# Patient Record
Sex: FEMALE | Race: WHITE | NOT HISPANIC OR LATINO | Employment: FULL TIME | URBAN - METROPOLITAN AREA
[De-identification: names, ages, dates, MRNs, and addresses within clinical notes are randomized per-mention and may not be internally consistent; named-entity substitution may affect disease eponyms.]

---

## 2024-06-27 ENCOUNTER — HOSPITAL ENCOUNTER (EMERGENCY)
Facility: HOSPITAL | Age: 44
Discharge: HOME | End: 2024-06-27
Attending: EMERGENCY MEDICINE
Payer: OTHER MISCELLANEOUS

## 2024-06-27 ENCOUNTER — APPOINTMENT (OUTPATIENT)
Dept: GENERAL RADIOLOGY | Facility: HOSPITAL | Age: 44
End: 2024-06-27
Payer: OTHER MISCELLANEOUS

## 2024-06-27 VITALS
SYSTOLIC BLOOD PRESSURE: 104 MMHG | WEIGHT: 180.34 LBS | TEMPERATURE: 97.5 F | DIASTOLIC BLOOD PRESSURE: 57 MMHG | BODY MASS INDEX: 30.79 KG/M2 | OXYGEN SATURATION: 100 % | HEIGHT: 64 IN | HEART RATE: 77 BPM | RESPIRATION RATE: 20 BRPM

## 2024-06-27 DIAGNOSIS — S90.01XA CONTUSION OF RIGHT ANKLE, INITIAL ENCOUNTER: Primary | ICD-10-CM

## 2024-06-27 DIAGNOSIS — S90.31XA CONTUSION OF RIGHT HEEL, INITIAL ENCOUNTER: ICD-10-CM

## 2024-06-27 DIAGNOSIS — T14.8XXA SKIN AVULSION: ICD-10-CM

## 2024-06-27 PROCEDURE — 90471 IMMUNIZATION ADMIN: CPT | Performed by: NURSE PRACTITIONER

## 2024-06-27 PROCEDURE — 25010000002 TETANUS-DIPHTH-ACELL PERTUSSIS 5-2.5-18.5 LF-MCG/0.5 SUSPENSION PREFILLED SYRINGE: Performed by: NURSE PRACTITIONER

## 2024-06-27 PROCEDURE — 90715 TDAP VACCINE 7 YRS/> IM: CPT | Performed by: NURSE PRACTITIONER

## 2024-06-27 PROCEDURE — 99283 EMERGENCY DEPT VISIT LOW MDM: CPT

## 2024-06-27 PROCEDURE — 73610 X-RAY EXAM OF ANKLE: CPT

## 2024-06-27 RX ORDER — TRAMADOL HYDROCHLORIDE 50 MG/1
50 TABLET ORAL ONCE
Status: COMPLETED | OUTPATIENT
Start: 2024-06-27 | End: 2024-06-27

## 2024-06-27 RX ADMIN — TETANUS TOXOID, REDUCED DIPHTHERIA TOXOID AND ACELLULAR PERTUSSIS VACCINE, ADSORBED 0.5 ML: 5; 2.5; 8; 8; 2.5 SUSPENSION INTRAMUSCULAR at 04:18

## 2024-06-27 RX ADMIN — TRAMADOL HYDROCHLORIDE 50 MG: 50 TABLET, FILM COATED ORAL at 05:00

## 2024-06-27 NOTE — ED PROVIDER NOTES
"Time: 3:40 AM EDT  Date of encounter:  6/27/2024  Independent Historian/Clinical History and Information was obtained by:   Patient    History is limited by: N/A    Chief Complaint: Right ankle pain and injury      History of Present Illness:  Patient is a 44 y.o. year old female who presents to the emergency department for evaluation of right ankle pain and injury.  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  She states it is shooting pain from the back of her heel all the way to her toes.  No numbness tingling or weakness.  Patient suffered a small to the back of her ankle when she jerked her leg free.  Tetanus is not up-to-date.  Patient rates pain 8 out of 10    HPI    Patient Care Team  Primary Care Provider: Provider, No Known    Past Medical History:     No Known Allergies  History reviewed. No pertinent past medical history.  History reviewed. No pertinent surgical history.  History reviewed. No pertinent family history.    Home Medications:  Prior to Admission medications    Not on File        Social History:          Review of Systems:  Review of Systems   Musculoskeletal:  Positive for arthralgias (Right ankle and heel) and gait problem (Pain with ambulation). Negative for back pain and joint swelling.   Skin:  Positive for wound (Had a cut on the back of her ankle from where she jerked her foot out).   Neurological:  Negative for weakness and numbness.   Hematological: Negative.    Psychiatric/Behavioral: Negative.     All other systems reviewed and are negative.       Physical Exam:  /57 (BP Location: Left arm, Patient Position: Sitting)   Pulse 77   Temp 97.5 °F (36.4 °C) (Oral)   Resp 20   Ht 162.6 cm (64\")   Wt 81.8 kg (180 lb 5.4 oz)   LMP 06/09/2024   SpO2 100%   BMI 30.95 kg/m²     Physical " Exam  Vitals and nursing note reviewed.   HENT:      Nose: Nose normal.      Mouth/Throat:      Mouth: Mucous membranes are moist.   Eyes:      Conjunctiva/sclera: Conjunctivae normal.   Cardiovascular:      Pulses: Normal pulses.   Pulmonary:      Effort: Pulmonary effort is normal.   Musculoskeletal:         General: Tenderness (Tenderness posterior ankle and heel as well as lateral ankle) present. No swelling.      Cervical back: Normal range of motion.      Comments: Painful range of motion of any degree of right ankle   Skin:     Capillary Refill: Capillary refill takes less than 2 seconds.      Comments: Tiny superficial 1 cm skin avulsion to posterior ankle no active bleeding.  No repairable wound   Neurological:      General: No focal deficit present.      Mental Status: She is alert.   Psychiatric:         Mood and Affect: Mood normal.         Behavior: Behavior normal.      Comments: Patient anxious                  Medical Decision Making:      Comorbidities that affect care:    None    External Notes reviewed:    None      The following orders were placed and all results were independently analyzed by me:  Orders Placed This Encounter   Procedures    [facility] 11.  Crutches; Prevents Completion of MRADLs Within Reasonable Time Frame; Able to Safely Use Equipment; Mobility Deficit Can Be Sufficiently Resolved By Use of Equipment    XR Ankle 3+ View Right    Apply ace wrap       Medications Given in the Emergency Department:  Medications   traMADol (ULTRAM) tablet 50 mg (has no administration in time range)   Tetanus-Diphth-Acell Pertussis (BOOSTRIX) injection 0.5 mL (0.5 mL Intramuscular Given 6/27/24 0418)        ED Course:    ED Course as of 06/27/24 0451   Thu Jun 27, 2024   0431 XR Ankle 3+ View Right  No acute findings. Chronic spurring [DS]      ED Course User Index  [DS] Bella Weiss APRN       Labs:    Lab Results (last 24 hours)       ** No results found for the last 24 hours. **              Imaging:    XR Ankle 3+ View Right    Result Date: 6/27/2024  XR ANKLE 3+ VW RIGHT Date of Exam: 6/27/2024 4:10 AM EDT Indication: injury/ pain Comparison: None available. Findings: Small posterior and plantar calcaneal spurs are present. There is mild chronic spurring on both sides of the ankle. No acute fracture. No dislocation. No bone erosion or destruction. Soft tissues are normal.     Chronic changes without acute fracture or dislocation. Electronically Signed: Ashwin Feliz MD  6/27/2024 4:22 AM EDT  Workstation ID: [id number]       Differential Diagnosis and Discussion:    Extremity Pain: Differential diagnosis includes but is not limited to soft tissue sprain, tendonitis, tendon injury, dislocation, fracture, deep vein thrombosis, arterial insufficiency, osteoarthritis, bursitis, and ligamentous damage.    All X-rays impressions were independently interpreted by me.    MDM  Number of Diagnoses or Management Options  Contusion of right ankle, initial encounter  Contusion of right heel, initial encounter  Skin avulsion  Diagnosis management comments: The patient's symptoms are consistent with a strain vs. sprain.      A muscle strain, also known as a pulled muscle, is an injury that occurs when a muscle is overstretched or torn, often as a result of fatigue, overuse, or improper use. This can result in pain, swelling, and a limited range of motion.     A sprain, on the other hand, is an injury to a ligament, which is the tissue that connects bones to each other. Sprains often occur in joints like the ankle or wrist when they are twisted or impacted in a way that stretches or tears the ligaments. Symptoms of a sprain can include pain, swelling, bruising, and a decreased ability to move the joint.     The patient was counseled to use rest, ice, and elevation and follow-up with their PCP or an orthopedic surgeon.       Amount and/or Complexity of Data Reviewed  Tests in the radiology section of CPT®:  reviewed and ordered  Tests in the medicine section of CPT®: ordered and reviewed    Risk of Complications, Morbidity, and/or Mortality  Presenting problems: low  Diagnostic procedures: low  Management options: low    Patient Progress  Patient progress: stable         Patient Care Considerations:    NARCOTICS: I considered prescribing opiate pain medication as an outpatient, however no acute abnormality on imaging.  Chronic spurring      Consultants/Shared Management Plan:    None    Social Determinants of Health:    Patient is independent, reliable, and has access to care.       Disposition and Care Coordination:    Discharged: The patient is suitable and stable for discharge with no need for consideration of admission.    I have explained the patient´s condition, diagnoses and treatment plan based on the information available to me at this time. I have answered questions and addressed any concerns. The patient has a good  understanding of the patient´s diagnosis, condition, and treatment plan as can be expected at this point. The vital signs have been stable. The patient´s condition is stable and appropriate for discharge from the emergency department.      The patient will pursue further outpatient evaluation with the primary care physician or other designated or consulting physician as outlined in the discharge instructions. They are agreeable to this plan of care and follow-up instructions have been explained in detail. The patient has received these instructions in written format and has expressed an understanding of the discharge instructions. The patient is aware that any significant change in condition or worsening of symptoms should prompt an immediate return to this or the closest emergency department or call to 911.  I have explained discharge medications and the need for follow up with the patient/caretakers. This was also printed in the discharge instructions. Patient was discharged with the following  medications and follow up:      Medication List        New Prescriptions      diclofenac 50 MG EC tablet  Commonly known as: VOLTAREN  Take 1 tablet by mouth 3 (Three) Times a Day As Needed (pain).               Where to Get Your Medications        These medications were sent to [facility]/pharmacy #[id number] - [place], [place] - [address] - [phone]  - [phone] FX  [address] [place] [address]      Hours: 24-hours Phone: [phone]   diclofenac 50 MG EC tablet      [facility] [place]  [address]  [facility] [address] [phone]  Schedule an appointment as soon as possible for a visit          Final diagnoses:   Contusion of right ankle, initial encounter   Contusion of right heel, initial encounter   Skin avulsion        ED Disposition       ED Disposition   Discharge    Condition   Stable    Comment   --               This medical record created using voice recognition software.             Bella Weiss, APRN  06/27/24 3898

## 2024-06-27 NOTE — DISCHARGE INSTRUCTIONS
Keep skin avulsion area clean and dry.  Wash with soap and water and pat dry may apply topical antibiotic ointment.    Ace wrap your foot and ankle for comfort and use crutches for ambulation.    Take medication as prescribed for pain.    Follow-up with work well for further evaluation and possible referral to podiatry as needed for chronic spurring